# Patient Record
Sex: MALE | Race: BLACK OR AFRICAN AMERICAN
[De-identification: names, ages, dates, MRNs, and addresses within clinical notes are randomized per-mention and may not be internally consistent; named-entity substitution may affect disease eponyms.]

---

## 2017-11-10 ENCOUNTER — HOSPITAL ENCOUNTER (INPATIENT)
Dept: HOSPITAL 74 - YASAS | Age: 52
LOS: 11 days | Discharge: HOME | DRG: 772 | End: 2017-11-21
Attending: PSYCHIATRY & NEUROLOGY | Admitting: PSYCHIATRY & NEUROLOGY
Payer: COMMERCIAL

## 2017-11-10 VITALS — BODY MASS INDEX: 30.4 KG/M2

## 2017-11-10 DIAGNOSIS — F19.24: ICD-10-CM

## 2017-11-10 DIAGNOSIS — E11.9: ICD-10-CM

## 2017-11-10 DIAGNOSIS — F10.20: Primary | ICD-10-CM

## 2017-11-10 DIAGNOSIS — F16.10: ICD-10-CM

## 2017-11-10 DIAGNOSIS — I10: ICD-10-CM

## 2017-11-10 DIAGNOSIS — F41.8: ICD-10-CM

## 2017-11-10 DIAGNOSIS — F17.210: ICD-10-CM

## 2017-11-10 DIAGNOSIS — F14.10: ICD-10-CM

## 2017-11-10 LAB
ALBUMIN SERPL-MCNC: 3.4 G/DL (ref 3.4–5)
ALP SERPL-CCNC: 124 U/L (ref 45–117)
ALT SERPL-CCNC: 18 U/L (ref 12–78)
ANION GAP SERPL CALC-SCNC: 8 MMOL/L (ref 8–16)
APPEARANCE UR: (no result)
AST SERPL-CCNC: 9 U/L (ref 15–37)
BILIRUB SERPL-MCNC: 0.3 MG/DL (ref 0.2–1)
BILIRUB UR STRIP.AUTO-MCNC: NEGATIVE MG/DL
CALCIUM SERPL-MCNC: 8.7 MG/DL (ref 8.5–10.1)
CO2 SERPL-SCNC: 24 MMOL/L (ref 21–32)
COLOR UR: YELLOW
CREAT SERPL-MCNC: 1.1 MG/DL (ref 0.7–1.3)
DEPRECATED RDW RBC AUTO: 12.9 % (ref 11.9–15.9)
GLUCOSE SERPL-MCNC: 248 MG/DL (ref 74–106)
KETONES UR QL STRIP: NEGATIVE
MCH RBC QN AUTO: 30.2 PG (ref 25.7–33.7)
MCHC RBC AUTO-ENTMCNC: 34.5 G/DL (ref 32–35.9)
MCV RBC: 87.7 FL (ref 80–96)
NITRITE UR QL STRIP: NEGATIVE
PH UR: 5 [PH] (ref 5–8)
PLATELET # BLD AUTO: 265 K/MM3 (ref 134–434)
PMV BLD: 9 FL (ref 7.5–11.1)
PROT SERPL-MCNC: 6.6 G/DL (ref 6.4–8.2)
PROT UR QL STRIP: (no result)
PROT UR QL STRIP: NEGATIVE
RBC # UR STRIP: NEGATIVE /UL
SP GR UR: 1.02 (ref 1–1.03)
UROBILINOGEN UR STRIP-MCNC: NEGATIVE MG/DL (ref 0.2–1)
WBC # BLD AUTO: 9.2 K/MM3 (ref 4–10)

## 2017-11-10 PROCEDURE — G0008 ADMIN INFLUENZA VIRUS VAC: HCPCS

## 2017-11-10 PROCEDURE — HZ42ZZZ GROUP COUNSELING FOR SUBSTANCE ABUSE TREATMENT, COGNITIVE-BEHAVIORAL: ICD-10-PCS | Performed by: PSYCHIATRY & NEUROLOGY

## 2017-11-10 RX ADMIN — NICOTINE SCH: 21 PATCH TRANSDERMAL at 17:15

## 2017-11-10 RX ADMIN — Medication SCH MG: at 21:46

## 2017-11-10 RX ADMIN — LISINOPRIL SCH MG: 10 TABLET ORAL at 17:14

## 2017-11-10 RX ADMIN — METFORMIN HYDROCHLORIDE SCH MG: 500 TABLET ORAL at 17:14

## 2017-11-10 NOTE — HP
Admission [facility]


Chief Complaint: 





I AM HERE FOR REHAB FROM HEROIN


Allergies/Adverse Reactions: 


 Allergies











Allergy/AdvReac Type Severity Reaction Status Date / Time


 


No Known Allergies Allergy   Verified 11/10/17 13:51











History of Present Illness: 





THIS 52 YEARS OLD MALE WITH HEROIN DEPENDENCE,SEEKING REAHAB,LAST TREATMENT [facility] 

07/17,SUICASA 4 MONTHS AGO,


METHADONE MAINTENANCE 120 MGS/DAY,LAST MEDICATE 11/02/17


TYPE 2 DM


HYPERTENSION


DEPRESSION


NICOTINE DEPENDENCE


LONGEST PERIOD OF SOBRIETY 14 YEARS


Exam Limitations: No Limitations





- Ebola screening


Have you traveled outside of the country in the last 21 days: No (N)


Have you had contact with anyone from an Ebola affected area: No


Have you been sick,other than usual withdrawal symptoms: No


Do you have a fever: No





- Review of Systems


Constitutional: No Symptoms Reported


EENT: reports: No Symptoms Reported


Respiratory: reports: No Symptoms reported


Cardiac: reports: No Symptoms Reported


GI: reports: No Symptoms Reported


: reports: No Symptoms Reported


Musculoskeletal: reports: No Symptoms Reported (FX OF RIGHT WRIST AND TENDON 

INJURY RIGHT HAND 2 AND A HALF WEEKS AGO)


Integumentary: reports: No Symptoms Reported


Neuro: reports: No Symptoms reported


Endocrine: reports: No Symptoms Reported


Hematology: reports: No Symptoms Reported


Psychiatric: reports: No Sypmtoms Reported, Judgement Intact, Mood/Affect 

Appropiate, Anxious, Depressed





Patient History





- Patient Medical History


Hx Anemia: No


Hx Asthma: No


Hx Chronic Obstructive Pulmonary Disease (COPD): No


Hx Cancer: No


Hx Cardiac Disorders: No


Hx Congestive Heart Failure: No


Hx Hypertension: Yes (ON MED)


Hx Hypercholesterolemia: No


Hx Pacemaker: No


HX Cerebrovascular Accident: No


Hx Seizures: No


Hx Dementia: No


Hx Diabetes: Yes (TYPE 2 DM)


Hx Gastrointestinal Disorders: No


Hx Liver Disease: No


Hx Genitourinary Disorders: No


Hx Sexually Transmitted Disorders: No


Hx Renal Disease (ESRD): No


Hx Thyroid Disease: No


Hx Human Immunodeficiency Virus (HIV): No (LAST  06/17 NEGATIVE)


Hx Hepatitis C: No


Hx Depression: Yes (ANXIETY)


Hx Suicide Attempt: No


Hx Bipolar Disorder: No


Hx Schizophrenia: No


Other Medical History: NO SUICIDAL,NO HOMICIDAL





- Patient Surgical History


Past Surgical History: No





- PPD History


Previous Implant?: Yes


Documented Results: Negative w/o proof


PPD to be Administered?: Yes





- Smoking Cessation


Smoking history: Current every day smoker


Have you smoked in the past 12 months: Yes


Aproximately how many cigarettes per day: 20


Cigars Per Day: 0


Hx Chewing Tobacco Use: No


Initiated information on smoking cessation: Yes


'Breaking Loose' booklet given: 11/10/17





- Substance & Tx. History


Hx Alcohol Use: No


Hx Substance Use: Yes


Substance Use Type: Heroin


Hx Substance Use Treatment: Yes ([facility] IN 07/17)





- Substances Abused


  ** Heroin


Route: Inhalation


Frequency: Daily


Amount used: 6 bags


Age of first use: 27


Date of Last Use: 11/10/17





Family Disease History





- Family Disease History


Family History: Denies





Admission Physical Exam [facility]





- Vital Signs


Vital Signs: 


 Vital Signs - 24 hr











  11/10/17





  11:36


 


Temperature 96.2 F L


 


Pulse Rate 81


 


Respiratory 18





Rate 


 


Blood Pressure 145/93














- Physical


General Appearance: Yes: Within Normal Limits


HEENTM: Yes: Within Normal Limits, Normal ENT Inspection, BEREKET, Pharynx Normal


Respiratory: Yes: Lungs Clear, Normal Breath Sounds, No Respiratory Distress


Neck: Yes: Within Normal Limits


Breast: Yes: Within Normal Limits


Cardiology: Yes: Within Normal Limits, Regular Rhythm, Regular Rate, S1, S2


Abdominal: Yes: Within Normal Limits, Normal Bowel Sounds, Non Tender, Soft


Genitourinary: Yes: Within Normal Limits


Back: Yes: Within Normal Limits


Musculoskeletal: Yes: Muscle Pain


Extremities: Yes: Other (FX OF RIGHT WRIST AND SPRAIN OF TENDON OF RIGHT 5TH 

FINGER)


Neurological: Yes: CNs II-XII NML intact, Fully Oriented, Alert, Motor Strength 

5/5, Normal Mood/Affect


Integumentary: Yes: Within Normal Limits


Lymphatic: Yes: Within Normal Limits





- Diagnostic


(1) Heroin dependence


Current Visit: Yes   Status: Acute   





(2) Methadone maintenance therapy patient


Current Visit: Yes   Status: Acute   





(3) Essential hypertension


Current Visit: Yes   Status: Acute   





(4) DM2 (diabetes mellitus, type 2)


Current Visit: Yes   Status: Acute   





(5) Anxiety and depression


Current Visit: Yes   Status: Acute   





Cleared for Admission [facility]





- Detox or Rehab


Claeared for Rehab Admission: Yes





[facility] Breath Alcohol Content


Breath Alcohol Content: 0





Urine Drug Screen





- Results


Drug Screen Negative: No


Urine Drug Screen Results: KAITLYNN-Cocaine, OPI-Opiates, PCP-Phencyclidine, MTD-

Methadone





Inpatient Rehab Admission





- Initial Determination


Are CD services needed?: Yes


Free of communicable disease: Yes


Not in need of hospitalization: Yes





- Rehab Admission Criteria


Previous failed treatment: Yes


Poor recovery environment: Yes


Comorbidities: Yes


Patient is meeting Inpatient Rehab admission criteria:: Yes

## 2017-11-11 RX ADMIN — METFORMIN HYDROCHLORIDE SCH MG: 500 TABLET ORAL at 06:41

## 2017-11-11 RX ADMIN — PSEUDOEPHEDRINE HCL AND TRIPROLIDINE HCL PRN COMBO: 60; 2.5 TABLET, FILM COATED ORAL at 21:32

## 2017-11-11 RX ADMIN — Medication SCH TAB: at 10:15

## 2017-11-11 RX ADMIN — LISINOPRIL SCH MG: 10 TABLET ORAL at 10:15

## 2017-11-11 RX ADMIN — METFORMIN HYDROCHLORIDE SCH MG: 500 TABLET ORAL at 16:59

## 2017-11-11 RX ADMIN — NICOTINE SCH: 21 PATCH TRANSDERMAL at 10:29

## 2017-11-11 RX ADMIN — Medication SCH MG: at 21:31
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
## 2017-11-12 RX ADMIN — Medication SCH TAB: at 10:09

## 2017-11-12 RX ADMIN — NICOTINE SCH: 21 PATCH TRANSDERMAL at 10:09

## 2017-11-12 RX ADMIN — Medication SCH: at 21:42

## 2017-11-12 RX ADMIN — METFORMIN HYDROCHLORIDE SCH MG: 500 TABLET ORAL at 16:35

## 2017-11-12 RX ADMIN — LISINOPRIL SCH MG: 10 TABLET ORAL at 10:09

## 2017-11-12 RX ADMIN — METFORMIN HYDROCHLORIDE SCH MG: 500 TABLET ORAL at 06:36

## 2017-11-12 NOTE — EKG
Test Reason : 

Blood Pressure : ***/*** mmHG

Vent. Rate : 070 BPM     Atrial Rate : 070 BPM

   P-R Int : 136 ms          QRS Dur : 076 ms

    QT Int : 404 ms       P-R-T Axes : 063 -42 011 degrees

   QTc Int : 436 ms

 

NORMAL SINUS RHYTHM

LEFT AXIS DEVIATION

ABNORMAL ECG

NO PREVIOUS ECGS AVAILABLE

REPEAT EKG IF CLINICALLY INDICATED

Confirmed by RAMESH BALL MD ([id number]) on 11/12/2017 3:41:14 PM

 

Referred By: BETO TAVAREZ           Confirmed By:RAMESH BALL MD

## 2017-11-13 RX ADMIN — NICOTINE POLACRILEX PRN MG: 4 GUM, CHEWING ORAL at 09:54

## 2017-11-13 RX ADMIN — LISINOPRIL SCH MG: 10 TABLET ORAL at 09:52

## 2017-11-13 RX ADMIN — NICOTINE SCH: 21 PATCH TRANSDERMAL at 09:52

## 2017-11-13 RX ADMIN — METFORMIN HYDROCHLORIDE SCH MG: 500 TABLET ORAL at 06:08

## 2017-11-13 RX ADMIN — Medication SCH: at 21:38

## 2017-11-13 RX ADMIN — Medication SCH TAB: at 09:51

## 2017-11-13 RX ADMIN — METFORMIN HYDROCHLORIDE SCH MG: 500 TABLET ORAL at 16:41

## 2017-11-14 RX ADMIN — METFORMIN HYDROCHLORIDE SCH MG: 500 TABLET ORAL at 06:12

## 2017-11-14 RX ADMIN — NICOTINE SCH: 21 PATCH TRANSDERMAL at 10:02

## 2017-11-14 RX ADMIN — Medication SCH: at 21:21

## 2017-11-14 RX ADMIN — LISINOPRIL SCH MG: 10 TABLET ORAL at 10:02

## 2017-11-14 RX ADMIN — NICOTINE POLACRILEX PRN MG: 4 GUM, CHEWING ORAL at 10:03

## 2017-11-14 RX ADMIN — Medication SCH TAB: at 10:02

## 2017-11-14 RX ADMIN — METFORMIN HYDROCHLORIDE SCH MG: 500 TABLET ORAL at 16:46

## 2017-11-15 RX ADMIN — LISINOPRIL SCH MG: 10 TABLET ORAL at 10:07

## 2017-11-15 RX ADMIN — METFORMIN HYDROCHLORIDE SCH MG: 500 TABLET ORAL at 16:49

## 2017-11-15 RX ADMIN — NICOTINE SCH: 21 PATCH TRANSDERMAL at 10:07

## 2017-11-15 RX ADMIN — METFORMIN HYDROCHLORIDE SCH MG: 500 TABLET ORAL at 06:07

## 2017-11-15 RX ADMIN — NICOTINE POLACRILEX PRN MG: 4 GUM, CHEWING ORAL at 10:09

## 2017-11-15 RX ADMIN — Medication SCH TAB: at 10:06

## 2017-11-15 RX ADMIN — Medication SCH: at 21:17

## 2017-11-15 NOTE — PN
[facility] Progress Note (SOAP)


Subjective: 





requesting to stayt on methadone 100mg daily, does ot want to increase dose, c/

o low sex drive


Objective: 





11/15/17 15:07


 Vital Signs - 8 hr











  11/15/17 11/15/17





  07:43 10:00


 


Temperature 97.0 F L 


 


Pulse Rate 67 71


 


Respiratory 18 





Rate  


 


Blood Pressure 144/70 133/72








 Laboratory Tests











  11/10/17 11/10/17 11/10/17





  13:58 14:00 14:00


 


WBC   9.2 


 


RBC   3.59 L 


 


Hgb   10.9 L 


 


Hct   31.5 L 


 


MCV   87.7 


 


MCH   30.2 


 


MCHC   34.5 


 


RDW   12.9 


 


Plt Count   265 


 


MPV   9.0 


 


Sodium    140


 


Potassium    4.0


 


Chloride    108 H


 


Carbon Dioxide    24


 


Anion Gap    8


 


BUN    19 H


 


Creatinine    1.1


 


Creat Clearance w eGFR    > 60


 


POC Glucometer  261  


 


Random Glucose    248 H


 


Calcium    8.7


 


Total Bilirubin    0.3


 


AST    9 L


 


ALT    18


 


Alkaline Phosphatase    124 H


 


Total Protein    6.6


 


Albumin    3.4


 


Urine Color   


 


Urine Appearance   


 


Urine pH   


 


Ur Specific Gravity   


 


Urine Protein   


 


Urine Glucose (UA)   


 


Urine Ketones   


 


Urine Blood   


 


Urine Nitrite   


 


Urine Bilirubin   


 


Urine Urobilinogen   


 


Ur Leukocyte Esterase   


 


RPR Titer   














  11/10/17 11/10/17 11/10/17





  14:00 17:09 19:00


 


WBC   


 


RBC   


 


Hgb   


 


Hct   


 


MCV   


 


MCH   


 


MCHC   


 


RDW   


 


Plt Count   


 


MPV   


 


Sodium   


 


Potassium   


 


Chloride   


 


Carbon Dioxide   


 


Anion Gap   


 


BUN   


 


Creatinine   


 


Creat Clearance w eGFR   


 


POC Glucometer   191 


 


Random Glucose   


 


Calcium   


 


Total Bilirubin   


 


AST   


 


ALT   


 


Alkaline Phosphatase   


 


Total Protein   


 


Albumin   


 


Urine Color    Yellow


 


Urine Appearance    Slcloudy


 


Urine pH    5.0


 


Ur Specific Gravity    1.023


 


Urine Protein    Negative


 


Urine Glucose (UA)    3+ H


 


Urine Ketones    Negative


 


Urine Blood    Negative


 


Urine Nitrite    Negative


 


Urine Bilirubin    Negative


 


Urine Urobilinogen    Negative


 


Ur Leukocyte Esterase    Negative


 


RPR Titer  Nonreactive  














  11/11/17 11/11/17 11/12/17





  06:40 16:59 06:35


 


WBC   


 


RBC   


 


Hgb   


 


Hct   


 


MCV   


 


MCH   


 


MCHC   


 


RDW   


 


Plt Count   


 


MPV   


 


Sodium   


 


Potassium   


 


Chloride   


 


Carbon Dioxide   


 


Anion Gap   


 


BUN   


 


Creatinine   


 


Creat Clearance w eGFR   


 


POC Glucometer  165  129  168


 


Random Glucose   


 


Calcium   


 


Total Bilirubin   


 


AST   


 


ALT   


 


Alkaline Phosphatase   


 


Total Protein   


 


Albumin   


 


Urine Color   


 


Urine Appearance   


 


Urine pH   


 


Ur Specific Gravity   


 


Urine Protein   


 


Urine Glucose (UA)   


 


Urine Ketones   


 


Urine Blood   


 


Urine Nitrite   


 


Urine Bilirubin   


 


Urine Urobilinogen   


 


Ur Leukocyte Esterase   


 


RPR Titer   














  11/12/17 11/13/17 11/13/17





  16:34 06:09 16:41


 


WBC   


 


RBC   


 


Hgb   


 


Hct   


 


MCV   


 


MCH   


 


MCHC   


 


RDW   


 


Plt Count   


 


MPV   


 


Sodium   


 


Potassium   


 


Chloride   


 


Carbon Dioxide   


 


Anion Gap   


 


BUN   


 


Creatinine   


 


Creat Clearance w eGFR   


 


POC Glucometer  217  180  225


 


Random Glucose   


 


Calcium   


 


Total Bilirubin   


 


AST   


 


ALT   


 


Alkaline Phosphatase   


 


Total Protein   


 


Albumin   


 


Urine Color   


 


Urine Appearance   


 


Urine pH   


 


Ur Specific Gravity   


 


Urine Protein   


 


Urine Glucose (UA)   


 


Urine Ketones   


 


Urine Blood   


 


Urine Nitrite   


 


Urine Bilirubin   


 


Urine Urobilinogen   


 


Ur Leukocyte Esterase   


 


RPR Titer   














  11/14/17 11/14/17 11/15/17





  06:10 16:45 06:06


 


WBC   


 


RBC   


 


Hgb   


 


Hct   


 


MCV   


 


MCH   


 


MCHC   


 


RDW   


 


Plt Count   


 


MPV   


 


Sodium   


 


Potassium   


 


Chloride   


 


Carbon Dioxide   


 


Anion Gap   


 


BUN   


 


Creatinine   


 


Creat Clearance w eGFR   


 


POC Glucometer  174  192  153


 


Random Glucose   


 


Calcium   


 


Total Bilirubin   


 


AST   


 


ALT   


 


Alkaline Phosphatase   


 


Total Protein   


 


Albumin   


 


Urine Color   


 


Urine Appearance   


 


Urine pH   


 


Ur Specific Gravity   


 


Urine Protein   


 


Urine Glucose (UA)   


 


Urine Ketones   


 


Urine Blood   


 


Urine Nitrite   


 


Urine Bilirubin   


 


Urine Urobilinogen   


 


Ur Leukocyte Esterase   


 


RPR Titer   








hyperglycemia


Assessment: 





11/15/17 15:07


low sex drive, adequate methadone dose


Plan: 





reviewed medication , only methadone but patient reports has not affected sex 

drive in past, anemia noted, MVI ordered, follow up with PCP when discharged or 

psychaitrist in rehab. d/c dose escalation at patient request.

## 2017-11-16 RX ADMIN — Medication SCH TAB: at 10:15

## 2017-11-16 RX ADMIN — PSEUDOEPHEDRINE HCL AND TRIPROLIDINE HCL PRN COMBO: 60; 2.5 TABLET, FILM COATED ORAL at 08:46

## 2017-11-16 RX ADMIN — NICOTINE SCH: 21 PATCH TRANSDERMAL at 10:15

## 2017-11-16 RX ADMIN — METFORMIN HYDROCHLORIDE SCH MG: 500 TABLET ORAL at 06:22

## 2017-11-16 RX ADMIN — NICOTINE POLACRILEX PRN MG: 4 GUM, CHEWING ORAL at 10:17

## 2017-11-16 RX ADMIN — METHADONE HYDROCHLORIDE SCH MG: 40 TABLET ORAL at 06:22

## 2017-11-16 RX ADMIN — LISINOPRIL SCH MG: 10 TABLET ORAL at 10:15

## 2017-11-16 RX ADMIN — METFORMIN HYDROCHLORIDE SCH MG: 500 TABLET ORAL at 16:42

## 2017-11-16 RX ADMIN — Medication SCH MG: at 21:23

## 2017-11-16 NOTE — PN
Psychiatric Progress Note


Vital Signs: 


 Vital Signs











 Period  Temp  Pulse  Resp  BP Sys/Cabrera  Pulse Ox


 


 Last 24 Hr  97.9 F  61  18-20  119/74  











Date of Session: 11/16/17


Chief Complaint:: progress update


HPI: patient is addressing opioid, nicotine dependence comoerbid substance 

induced mood siorder.


ROS: WNL


Current Medications: 


Active Medications











Generic Name Dose Route Start Last Admin





  Trade Name Freq  PRN Reason Stop Dose Admin


 


Acetaminophen  650 mg  11/10/17 14:17  





  Tylenol -  PO   





  Q4H PRN   





  PAIN   


 


Al Hydroxide/Mg Hydroxide  30 ml  11/10/17 14:17  





  Mylanta Oral Suspension -  PO   





  Q6H PRN   





  DYSPEPSIA   


 


Bupropion HCl  150 mg  11/16/17 13:22  





  Wellbutrin -  PO   





  DAILY [facility]   


 


Escitalopram Oxalate  10 mg  11/17/17 10:00  





  Lexapro -  PO   





  DAILY [facility]   


 


Eucalyptus/Menthol/Phenol/Sorbitol  1 each  11/10/17 14:17  





  Cepastat Lozenge -  MM   





  Q4H PRN   





  SORE THROAT   


 


Guaifenesin  10 ml  11/10/17 14:17  





  Robitussin Dm -  PO   





  Q6H PRN   





  COUGH   


 


Hydroxyzine Pamoate  50 mg  11/10/17 14:17  11/10/17 21:47





  Vistaril -  PO   50 mg





  Q4H PRN   Administration





  AGITATION   


 


Ibuprofen  400 mg  11/10/17 14:17  





  Motrin -  PO   





  Q6H PRN   





  SEVERE PAIN   


 


Lisinopril  10 mg  11/10/17 14:30  11/16/17 10:15





  Prinivil  PO   10 mg





  DAILY RADHA   Administration


 


Loperamide HCl  4 mg  11/10/17 14:17  





  Imodium -  PO   





  Q6H PRN   





  DIARRHEA   


 


Magnesium Citrate  300 ml  11/10/17 14:17  11/14/17 08:44





  Citroma -  PO   300 ml





  Q48H PRN   Administration





  CONSTIPATION   


 


Magnesium Hydroxide  30 ml  11/10/17 14:17  





  Milk Of Magnesia -  PO   





  DAILY PRN   





  CONSTIPATION   


 


Metformin HCl  1,000 mg  11/10/17 16:30  11/16/17 06:22





  Glucophage -  PO   1,000 mg





  BID@0700,1630 RADHA   Administration


 


Methadone HCl 80 mg/ Methadone  100 mg  11/16/17 06:00  11/16/17 06:22





  HCl 20 mg  PO   100 mg





  DAILY@0600 RADHA   Administration


 


Nicotine  21 mg  11/10/17 14:30  11/16/17 10:15





  Nicoderm Patch -  TD   Not Given





  DAILY RADHA   


 


Nicotine Polacrilex  4 mg  11/12/17 11:32  11/16/17 10:17





  Nicorette Gum -  BUC   4 mg





  Q2H PRN   Administration





  NICOTINE REPLACEMENT RX   


 


Prenatal Multivit/Folic Acid/Iron  1 tab  11/11/17 10:00  11/16/17 10:15





  Prenatal Vitamins (Sjr) -  PO   1 tab





  DAILY RADHA   Administration


 


Pseudoephedrine/Triprolidine  1 combo  11/10/17 14:17  11/16/17 08:46





  Actifed -  PO   1 combo





  TID PRN   Administration





  NASAL CONGESTION   


 


Thiamine HCl  100 mg  11/10/17 22:00  11/15/17 21:17





  Vitamin B1 -  PO   Not Given





  HS RADHA   











Current Side Effect: No


Lab tests ordered: No


Lab tests reviewed: Yes


Provider note:: Reviewed the chart,  admission note appreciated, met 

with the patient today. Patient reports has been feeling depressed and stated 

he wants to continue his antidepressants, reports was on lexapro 10 mg and 

wellbutrin 300 mg , he was non-compliant with medications and states stopped 

about 4 months ago, after evaluation  started wellbutrin 75 mg, 

reviewed medications with the patient, discussed treatment plan, patient agreed

, will increase wellbutrin 150 mg daily, add lexapro 10 mg, psycheducation and 

supportive therapy provided, continue to monitor progress.


Total face to face time:: 35





Mental Status Exam





- Mental Status Exam


Alert and Oriented to: Time, Place, Person


Cognitive Function: Good


Patient Appearance: Well Groomed


Mood: Sad


Affect: Mood Congruent


Patient Behavior: Appropriate, Cooperative


Speech Pattern: Clear, Appropriate


Voice Loudness: Normal


Thought Process: Intact, Goal Oriented


Thought Disorder: Not Present


Hallucinations: Denies


Suicidal Ideation: Denies


Homicidal Ideation: Denies


Insight/Judgement: Fair


Sleep: Fair


Appetite: Fair


Muscle strength/Tone: Normal


Gait/Station: Normal





Psychiatric Treatment Plan





- Problem List


(1) Heroin dependence


Current Visit: Yes   





(2) Methadone maintenance therapy patient


Current Visit: Yes   





(3) Nicotine dependence


Current Visit: Yes   





(4) Opioid dependence on agonist therapy


Current Visit: Yes

## 2017-11-17 RX ADMIN — NICOTINE SCH: 21 PATCH TRANSDERMAL at 10:03

## 2017-11-17 RX ADMIN — ESCITALOPRAM OXALATE SCH MG: 10 TABLET, FILM COATED ORAL at 10:02

## 2017-11-17 RX ADMIN — METFORMIN HYDROCHLORIDE SCH MG: 500 TABLET ORAL at 06:25

## 2017-11-17 RX ADMIN — Medication SCH TAB: at 10:02

## 2017-11-17 RX ADMIN — Medication SCH MG: at 21:25

## 2017-11-17 RX ADMIN — METFORMIN HYDROCHLORIDE SCH MG: 500 TABLET ORAL at 16:42

## 2017-11-17 RX ADMIN — METHADONE HYDROCHLORIDE SCH MG: 40 TABLET ORAL at 06:24

## 2017-11-17 RX ADMIN — LISINOPRIL SCH MG: 10 TABLET ORAL at 10:02

## 2017-11-17 RX ADMIN — NICOTINE POLACRILEX PRN MG: 4 GUM, CHEWING ORAL at 10:04

## 2017-11-17 RX ADMIN — TOLNAFTATE SCH APPLIC: 10 CREAM TOPICAL at 21:25

## 2017-11-18 RX ADMIN — METFORMIN HYDROCHLORIDE SCH MG: 500 TABLET ORAL at 16:39

## 2017-11-18 RX ADMIN — METFORMIN HYDROCHLORIDE SCH MG: 500 TABLET ORAL at 06:14

## 2017-11-18 RX ADMIN — METHADONE HYDROCHLORIDE SCH MG: 40 TABLET ORAL at 06:14

## 2017-11-18 RX ADMIN — PSEUDOEPHEDRINE HCL AND TRIPROLIDINE HCL PRN COMBO: 60; 2.5 TABLET, FILM COATED ORAL at 10:01

## 2017-11-18 RX ADMIN — NICOTINE SCH: 21 PATCH TRANSDERMAL at 10:00

## 2017-11-18 RX ADMIN — TOLNAFTATE SCH APPLIC: 10 CREAM TOPICAL at 21:22

## 2017-11-18 RX ADMIN — LISINOPRIL SCH MG: 10 TABLET ORAL at 09:59

## 2017-11-18 RX ADMIN — Medication SCH TAB: at 09:59

## 2017-11-18 RX ADMIN — TOLNAFTATE SCH APPLIC: 10 CREAM TOPICAL at 10:00

## 2017-11-18 RX ADMIN — ESCITALOPRAM OXALATE SCH MG: 10 TABLET, FILM COATED ORAL at 09:59

## 2017-11-18 RX ADMIN — Medication SCH MG: at 21:21

## 2017-11-19 RX ADMIN — TOLNAFTATE SCH APPLIC: 10 CREAM TOPICAL at 21:34

## 2017-11-19 RX ADMIN — TOLNAFTATE SCH APPLIC: 10 CREAM TOPICAL at 10:00

## 2017-11-19 RX ADMIN — LISINOPRIL SCH MG: 10 TABLET ORAL at 10:01

## 2017-11-19 RX ADMIN — Medication SCH MG: at 21:32

## 2017-11-19 RX ADMIN — ESCITALOPRAM OXALATE SCH MG: 10 TABLET, FILM COATED ORAL at 09:59

## 2017-11-19 RX ADMIN — Medication SCH TAB: at 09:58

## 2017-11-19 RX ADMIN — METFORMIN HYDROCHLORIDE SCH MG: 500 TABLET ORAL at 16:37

## 2017-11-19 RX ADMIN — NICOTINE POLACRILEX PRN MG: 4 GUM, CHEWING ORAL at 10:01

## 2017-11-19 RX ADMIN — METFORMIN HYDROCHLORIDE SCH MG: 500 TABLET ORAL at 06:12

## 2017-11-19 RX ADMIN — METHADONE HYDROCHLORIDE SCH MG: 40 TABLET ORAL at 06:12

## 2017-11-19 RX ADMIN — NICOTINE SCH: 21 PATCH TRANSDERMAL at 09:59

## 2017-11-20 VITALS — TEMPERATURE: 98.1 F

## 2017-11-20 RX ADMIN — METFORMIN HYDROCHLORIDE SCH MG: 500 TABLET ORAL at 06:11

## 2017-11-20 RX ADMIN — ESCITALOPRAM OXALATE SCH MG: 10 TABLET, FILM COATED ORAL at 10:34

## 2017-11-20 RX ADMIN — TOLNAFTATE SCH: 10 CREAM TOPICAL at 21:38

## 2017-11-20 RX ADMIN — METHADONE HYDROCHLORIDE SCH MG: 40 TABLET ORAL at 06:11

## 2017-11-20 RX ADMIN — Medication SCH MG: at 21:37

## 2017-11-20 RX ADMIN — LISINOPRIL SCH MG: 10 TABLET ORAL at 10:34

## 2017-11-20 RX ADMIN — METFORMIN HYDROCHLORIDE SCH MG: 500 TABLET ORAL at 16:37

## 2017-11-20 RX ADMIN — TOLNAFTATE SCH APPLIC: 10 CREAM TOPICAL at 10:35

## 2017-11-20 RX ADMIN — Medication SCH TAB: at 10:34

## 2017-11-20 RX ADMIN — NICOTINE SCH: 21 PATCH TRANSDERMAL at 10:34

## 2017-11-21 VITALS — DIASTOLIC BLOOD PRESSURE: 73 MMHG | SYSTOLIC BLOOD PRESSURE: 140 MMHG | HEART RATE: 84 BPM

## 2017-11-21 RX ADMIN — TOLNAFTATE SCH APPLIC: 10 CREAM TOPICAL at 09:37

## 2017-11-21 RX ADMIN — ESCITALOPRAM OXALATE SCH MG: 10 TABLET, FILM COATED ORAL at 09:37

## 2017-11-21 RX ADMIN — METHADONE HYDROCHLORIDE SCH MG: 40 TABLET ORAL at 06:27

## 2017-11-21 RX ADMIN — LISINOPRIL SCH MG: 10 TABLET ORAL at 09:37

## 2017-11-21 RX ADMIN — Medication SCH TAB: at 09:37

## 2017-11-21 RX ADMIN — METFORMIN HYDROCHLORIDE SCH MG: 500 TABLET ORAL at 06:28

## 2017-11-21 RX ADMIN — NICOTINE SCH: 21 PATCH TRANSDERMAL at 09:36

## 2017-11-21 NOTE — PN
Psychiatric Progress Note


Vital Signs: 


 Vital Signs











 Period  Temp  Pulse  Resp  BP Sys/Cabrera  Pulse Ox


 


 Last 24 Hr    66-84  16-81  131-140/71-73  











Date of Session: 11/21/17


Chief Complaint:: discharge visit


HPI: Patient has addressed opioid, nicotine dependence, PCP and cocaine abuse 

comorbid substance induced mood disorder.


ROS: HTN, DM medically managed.


Current Medications: 


Active Medications











Generic Name Dose Route Start Last Admin





  Trade Name Freq  PRN Reason Stop Dose Admin


 


Acetaminophen  650 mg  11/10/17 14:17  11/17/17 08:05





  Tylenol -  PO   650 mg





  Q4H PRN   Administration





  PAIN   


 


Al Hydroxide/Mg Hydroxide  30 ml  11/10/17 14:17  





  Mylanta Oral Suspension -  PO   





  Q6H PRN   





  DYSPEPSIA   


 


Bupropion HCl  150 mg  11/17/17 10:00  11/21/17 09:37





  Wellbutrin -  PO   150 mg





  DAILY RADHA   Administration


 


Escitalopram Oxalate  10 mg  11/17/17 10:00  11/21/17 09:37





  Lexapro -  PO   10 mg





  DAILY RADHA   Administration


 


Eucalyptus/Menthol/Phenol/Sorbitol  1 each  11/10/17 14:17  





  Cepastat Lozenge -  MM   





  Q4H PRN   





  SORE THROAT   


 


Guaifenesin  10 ml  11/10/17 14:17  11/16/17 21:23





  Robitussin Dm -  PO   10 ml





  Q6H PRN   Administration





  COUGH   


 


Hydroxyzine Pamoate  50 mg  11/10/17 14:17  11/10/17 21:47





  Vistaril -  PO   50 mg





  Q4H PRN   Administration





  AGITATION   


 


Ibuprofen  400 mg  11/10/17 14:17  





  Motrin -  PO   





  Q6H PRN   





  SEVERE PAIN   


 


Lisinopril  10 mg  11/10/17 14:30  11/21/17 09:37





  Prinivil  PO   10 mg





  DAILY RADHA   Administration


 


Loperamide HCl  4 mg  11/10/17 14:17  





  Imodium -  PO   





  Q6H PRN   





  DIARRHEA   


 


Magnesium Citrate  300 ml  11/10/17 14:17  11/14/17 08:44





  Citroma -  PO   300 ml





  Q48H PRN   Administration





  CONSTIPATION   


 


Magnesium Hydroxide  30 ml  11/10/17 14:17  11/20/17 21:39





  Milk Of Magnesia -  PO   30 ml





  DAILY PRN   Administration





  CONSTIPATION   


 


Metformin HCl  1,000 mg  11/10/17 16:30  11/21/17 06:28





  Glucophage -  PO   1,000 mg





  BID@0700,1630 RADHA   Administration


 


Methadone HCl 80 mg/ Methadone  100 mg  11/16/17 06:00  11/21/17 06:27





  HCl 20 mg  PO   100 mg





  DAILY@0600 RADHA   Administration


 


Nicotine  21 mg  11/10/17 14:30  11/21/17 09:36





  Nicoderm Patch -  TD   Not Given





  DAILY RADHA   


 


Nicotine Polacrilex  4 mg  11/12/17 11:32  11/19/17 10:01





  Nicorette Gum -  BUC   4 mg





  Q2H PRN   Administration





  NICOTINE REPLACEMENT RX   


 


Prenatal Multivit/Folic Acid/Iron  1 tab  11/11/17 10:00  11/21/17 09:37





  Prenatal Vitamins (Sjr) -  PO   1 tab





  DAILY RADHA   Administration


 


Pseudoephedrine/Triprolidine  1 combo  11/10/17 14:17  11/18/17 10:01





  Actifed -  PO   1 combo





  TID PRN   Administration





  NASAL CONGESTION   


 


Thiamine HCl  100 mg  11/10/17 22:00  11/20/17 21:37





  Vitamin B1 -  PO   100 mg





  HS RADHA   Administration


 


Tolnaftate  1 applic  11/17/17 22:00  11/21/17 09:37





  Tinactin 1% Cream -  TP   1 applic





  BID RADHA   Administration











Current Side Effect: No


Lab tests ordered: No


Lab tests reviewed: Yes


Provider note:: Patient has completed today his treatment and met his goals, 

will continue to address his issues at [facility] outpatient rehabilitation program. 

Patient gained insights into importance of changing attitudes/behavior for 

utilization of  supports available to prevent relapses. Medications(Wellbutrin 

and Lexapro) well tolerated, patient reports he feels much better, his mood 

improved, he is  hopeful, he will f/u by a psychiatrist at [facility] center. Patient 

wwas encouraged to take medications as directed. Scripts provided for 30 days, 

patient is stable for discharge today.


Total face to face time:: 15





Mental Status Exam





- Mental Status Exam


Alert and Oriented to: Time, Place, Person


Cognitive Function: Good


Patient Appearance: Well Groomed


Mood: Hopeful


Affect: Appropriate, Mood Congruent


Patient Behavior: Appropriate, Cooperative


Speech Pattern: Clear, Appropriate


Voice Loudness: Normal


Thought Process: Intact, Goal Oriented


Thought Disorder: Not Present


Hallucinations: Denies


Suicidal Ideation: Denies


Homicidal Ideation: Denies


Insight/Judgement: Fair


Appetite: Fair


Muscle strength/Tone: Normal


Gait/Station: Normal

## 2019-08-24 ENCOUNTER — HOSPITAL ENCOUNTER (EMERGENCY)
Dept: HOSPITAL 74 - JER | Age: 54
LOS: 1 days | Discharge: ANOTHER HOSPITAL | End: 2019-08-25
Payer: COMMERCIAL

## 2019-08-24 VITALS — BODY MASS INDEX: 28.5 KG/M2

## 2019-08-25 VITALS — SYSTOLIC BLOOD PRESSURE: 105 MMHG | DIASTOLIC BLOOD PRESSURE: 64 MMHG | HEART RATE: 61 BPM

## 2019-08-25 VITALS — TEMPERATURE: 98.2 F

## 2019-08-25 LAB
ALBUMIN SERPL-MCNC: 4.5 G/DL (ref 3.4–5)
ALP SERPL-CCNC: 131 U/L (ref 45–117)
ALT SERPL-CCNC: 15 U/L (ref 13–61)
ANION GAP SERPL CALC-SCNC: 10 MMOL/L (ref 8–16)
APTT BLD: 37.2 SECONDS (ref 25.2–36.5)
AST SERPL-CCNC: 13 U/L (ref 15–37)
BASOPHILS # BLD: 1 % (ref 0–2)
BILIRUB SERPL-MCNC: 0.6 MG/DL (ref 0.2–1)
BUN SERPL-MCNC: 24.5 MG/DL (ref 7–18)
CALCIUM SERPL-MCNC: 9.5 MG/DL (ref 8.5–10.1)
CHLORIDE SERPL-SCNC: 108 MMOL/L (ref 98–107)
CO2 SERPL-SCNC: 22 MMOL/L (ref 21–32)
CREAT SERPL-MCNC: 1.5 MG/DL (ref 0.55–1.3)
DEPRECATED RDW RBC AUTO: 13.6 % (ref 11.9–15.9)
EOSINOPHIL # BLD: 1.4 % (ref 0–4.5)
GLUCOSE SERPL-MCNC: 192 MG/DL (ref 74–106)
HCT VFR BLD CALC: 41.7 % (ref 35.4–49)
HGB BLD-MCNC: 14 GM/DL (ref 11.7–16.9)
INR BLD: 1.03 (ref 0.83–1.09)
LYMPHOCYTES # BLD: 16.5 % (ref 8–40)
MCH RBC QN AUTO: 30 PG (ref 25.7–33.7)
MCHC RBC AUTO-ENTMCNC: 33.6 G/DL (ref 32–35.9)
MCV RBC: 89.4 FL (ref 80–96)
MONOCYTES # BLD AUTO: 8.7 % (ref 3.8–10.2)
NEUTROPHILS # BLD: 72.4 % (ref 42.8–82.8)
PLATELET # BLD AUTO: 301 K/MM3 (ref 134–434)
PMV BLD: 9 FL (ref 7.5–11.1)
POTASSIUM SERPLBLD-SCNC: 4.4 MMOL/L (ref 3.5–5.1)
PROT SERPL-MCNC: 8.4 G/DL (ref 6.4–8.2)
PT PNL PPP: 12.2 SEC (ref 9.7–13)
RBC # BLD AUTO: 4.67 M/MM3 (ref 4–5.6)
SODIUM SERPL-SCNC: 140 MMOL/L (ref 136–145)
WBC # BLD AUTO: 12.1 K/MM3 (ref 4–10)

## 2019-08-25 NOTE — PDOC
History of Present Illness





- General


Chief Complaint: Wound


Stated Complaint: POSSIBLE BUG BITE LT HAND


Time Seen by Provider: 08/25/19 00:36


History Source: Patient


Exam Limitations: No Limitations





- History of Present Illness


Initial Comments: 


54 year old male with PMH HTN, DM presented to ED for increasing left hand 

swelling/redness x1 week, worsening over the last couple of days. Pt reported 

that he saw he was bitten by a bug x7 days ago, had the wound drained at the 

care home he was at at the time, then it recurred. Pt reported he has had increasing 

difficulty flexing his fingers. Pt denied fever, chills, nausea, vomiting, 

abdominal pain, sweats, numbness, tingling or any other symptoms. 





PCP: Dr. Tamayo





Past History





- Past Medical History


Allergies/Adverse Reactions: 


 Allergies











Allergy/AdvReac Type Severity Reaction Status Date / Time


 


No Known Allergies Allergy   Verified 08/25/19 03:56











Home Medications: 


Ambulatory Orders





Lisinopril 10 mg PO DAILY #30 tablet 11/20/17 


Metformin HCl [Glucophage] 1,000 mg PO BID #60 tablet 11/20/17 








Anemia: No


Asthma: No


Cancer: No


Cardiac Disorders: Yes


CVA: No


COPD: No


CHF: No


Dementia: No


Diabetes: Yes (NIDDM)


GI Disorders: No


 Disorders: No


HTN: Yes


Hypercholesterolemia: No


Kidney Stones: No


Liver Disease: No


Seizures: No


Thyroid Disease: No





- Suicide/Smoking/Psychosocial Hx


Smoking History: Never smoked


Have you smoked in the past 12 months: Yes


Number of Cigarettes Smoked Daily: 20


Cigars Per Day: 0


'Breaking Loose' booklet given: 11/10/17


Hx Alcohol Use: No


Drug/Substance Use Hx: No


Substance Use Type: Heroin


Hx Substance Use Treatment: Yes





**Review of Systems





- Review of Systems


Able to Perform ROS?: Yes


Comments:: 


General: denied fever, chills, generalized weakness.


HEENT: denied sore throat, rhinorrhea, ear pain.


Cardiovascular: denied chest pain, palpitations, syncope, diaphoresis.


Respiratory: denied shortness of breath, cough, sputum production, hemoptysis.


Gastrointestinal: denied abdominal pain, nausea, vomiting, diarrhea, 

constipation, blood in stool.


Genitourinary: denied dysuria, increased urinary frequency, hematuria, urinary 

incontinence, flank pain.


Back: denied back pain.


Musculoskeletal: admitted to hand pain, hand swelling, hand redness. 


Neurological: denied headache, dizziness, numbness, tingling, weakness. 


Integumentary: admitted to erythema. denied laceration, abrasion.


Hematologic/Lymphatic: denied bruising or bleeding. 





*Physical Exam





- Vital Signs


 Last Vital Signs











Temp Pulse Resp BP Pulse Ox


 


 98.1 F   97 H  20   156/84   95 


 


 08/24/19 23:13  08/24/19 23:13  08/24/19 23:13  08/24/19 23:13  08/24/19 23:13














- Physical Exam


Comments: 


Constitutional: Well-nourished, Well-developed, appearing stated age.


HEENT: head is normocephalic, atraumatic. EOMI. PERRLA. 


Neck: supple. Full ROM.


Cardiovascular: regular heart rhythm. no murmurs. no pericardial friction rub. 


Respiratory: clear to auscultation bilaterally. no crackles, rhonchi or 

wheezing. no stridor.


Gastrointestinal: soft, nontender. normal bowel sounds. no rebound, guarding, 

masses. 


Extremities: left hand dorsum surface 3x3 cm fluctuant mass with purulent 

drainage and surrounding erythema 6x6 cm. peripheral pulses intact. no lower 

extremity edema. pt is unable to flex all of his left hand fingers. no pain to 

palpation of flexor tendon of all 5 left hand fingers. pain with passive 

extension and flexion of all five fingers. no sausage fingers. 


Neurological: CN 2-12 grossly intact. moves all four extremities. 


Psych: awake, alert, oriented x3. follows commands. answers questions 

appropriately. 








ED Treatment Course





- LABORATORY


CBC & Chemistry Diagram: 


 08/25/19 02:50





 08/25/19 02:50





Medical Decision Making





- Medical Decision Making


54 year old male with above PMH presented to ED for left hand swelling/redness 

x1 week, worsening over the last 2 days. 


Examination significant for abscess to dorsum of hand with surrounding 

cellulitis. 


Hand abscess needs evaluation by Hand Surgery, Dr. Buckner. Will place consult 

and admit patient for IV antibiotics. 





 Initial Vital Signs











Temp Pulse Resp BP Pulse Ox


 


 98.1 F   97 H  20   156/84   95 


 


 08/24/19 23:13  08/24/19 23:13  08/24/19 23:13  08/24/19 23:13  08/24/19 23:13








Afebrile.


No tachycardia. 


No tachypnea. 


Mild hypertension. 


No hypoxia on room air. 





Labs ordered: CBC, CMP, blood cultures, coags, T&S


Imaging ordered: Hand XR


Medications ordered: tylenol 975 mg PO once, normal saline bolus 1000 cc, 

vancomycin, zosyn





EKG performed at 0411: rate 61, regular rhythm, normal axis, normal intervals, 

no acute ST changes. 





Bedside US showed a hypodense fluid collection consistent with abscess, with 

cobblestoning consistent with cellulitis. 





08/25/19 04:31


 CBC











WBC  12.1 K/mm3 (4.0-10.0)  H  08/25/19  02:50    


 


RBC  4.67 M/mm3 (4.00-5.60)   08/25/19  02:50    


 


Hgb  14.0 GM/dL (11.7-16.9)   08/25/19  02:50    


 


Hct  41.7 % (35.4-49)  D 08/25/19  02:50    


 


MCV  89.4 fl (80-96)   08/25/19  02:50    


 


MCH  30.0 pg (25.7-33.7)   08/25/19  02:50    


 


MCHC  33.6 g/dl (32.0-35.9)   08/25/19  02:50    


 


RDW  13.6 % (11.9-15.9)   08/25/19  02:50    


 


Plt Count  301 K/MM3 (134-434)   08/25/19  02:50    


 


MPV  9.0 fl (7.5-11.1)   08/25/19  02:50    


 


Absolute Neuts (auto)  8.7 K/mm3 (1.5-8.0)  H  08/25/19  02:50    


 


Neutrophils %  72.4 % (42.8-82.8)   08/25/19  02:50    


 


Lymphocytes %  16.5 % (8-40)   08/25/19  02:50    


 


Monocytes %  8.7 % (3.8-10.2)   08/25/19  02:50    


 


Eosinophils %  1.4 % (0-4.5)   08/25/19  02:50    


 


Basophils %  1.0 % (0-2.0)   08/25/19  02:50    


 


Nucleated RBC %  0 % (0-0)   08/25/19  02:50    








 CMP











Sodium  140 mmol/L (136-145)   08/25/19  02:50    


 


Potassium  4.4 mmol/L (3.5-5.1)   08/25/19  02:50    


 


Chloride  108 mmol/L ()  H  08/25/19  02:50    


 


Carbon Dioxide  22 mmol/L (21-32)   08/25/19  02:50    


 


Anion Gap  10 MMOL/L (8-16)   08/25/19  02:50    


 


BUN  24.5 mg/dL (7-18)  H  08/25/19  02:50    


 


Creatinine  1.5 mg/dL (0.55-1.3)  H  08/25/19  02:50    


 


Est GFR (CKD-EPI)AfAm  59.88   08/25/19  02:50    


 


Est GFR (CKD-EPI)NonAf  51.67   08/25/19  02:50    


 


Random Glucose  192 mg/dL ()  H  08/25/19  02:50    


 


Calcium  9.5 mg/dL (8.5-10.1)   08/25/19  02:50    


 


Total Bilirubin  0.6 mg/dL (0.2-1)   08/25/19  02:50    


 


AST  13 U/L (15-37)  L  08/25/19  02:50    


 


ALT  15 U/L (13-61)   08/25/19  02:50    


 


Alkaline Phosphatase  131 U/L ()  H  08/25/19  02:50    


 


Total Protein  8.4 g/dl (6.4-8.2)  H  08/25/19  02:50    


 


Albumin  4.5 g/dl (3.4-5.0)   08/25/19  02:50    








Leukocytosis with left shift. 


JAMES - IV fluids running


Mild ALP elevation





XR hand my read - no foreign body. no fracture. no dislocation. 


CXR my read - sharp costophrenic angles. no infiltrate. 


-Pending official reports. 





Pt to be admitted for surgical evaluation of his hand abscess, IV antibiotics 

for overlying cellulitis. 





08/25/19 05:29


Pt to be admitted under Dr. Gray's service. 


Dr. Buckner (Hand on call) paged. 





08/25/19 05:51


I spoke with Dr. Buckner' who stated he is not on call for Hand. 


Transfer to [facility] initiated. 


Inpatient team updated. 





08/25/19 06:18


I spoke with Ortho Resident at [facility] - Dr. Gilbert - who accepted the patient 

for transfer. 


I spoke with ED Attending at [facility] - Dr. Sheriff - who accepted the patient 

for transfer. 


Pending transfer. Estimated time for arrival of ambulance ~1 hour. 





*DC/Admit/Observation/Transfer


Diagnosis at time of Disposition: 


 Hand abscess, Cellulitis, Leukocytosis








- Discharge Dispostion


Disposition: TRANSFER ACUTE CARE/OTHER HOSP


Condition at time of disposition: Stable


Decision to Admit order: Yes





- Referrals





- Patient Instructions





- Post Discharge Activity

## 2019-08-25 NOTE — PDOC
History of Present Illness





- General


Chief Complaint: Wound


Stated Complaint: POSSIBLE BUG BITE LT HAND


Time Seen by Provider: 08/25/19 00:36





- History of Present Illness


Initial Comments: 





08/25/19 02:15


Patient is a 54-year-old male with history of diabetes here with left hand 

swelling and pain.  Noted to have a cellulitis of the left hand with diffiuclt 

to make a fist.  Will transfer to the main for further workup and possible 

admission.  





Past History





- Past Medical History


Allergies/Adverse Reactions: 


 Allergies











Allergy/AdvReac Type Severity Reaction Status Date / Time


 


No Known Allergies Allergy   Verified 11/10/17 13:51











Home Medications: 


Ambulatory Orders





Bupropion HCl [Wellbutrin -] 0 mg PO DAILY 11/10/17 


Lisinopril 10 mg PO DAILY #30 tablet 11/20/17 


Metformin HCl [Glucophage] 1,000 mg PO BID #60 tablet 11/20/17 


Bupropion HCl [Wellbutrin -] 150 mg PO DAILY #30 tablet 11/21/17 


Escitalopram Oxalate [Lexapro -] 10 mg PO DAILY #30 tablet 11/21/17 








Anemia: No


Asthma: No


Cancer: No


Cardiac Disorders: Yes


CVA: No


COPD: No


CHF: No


Dementia: No


Diabetes: Yes (NIDDM)


GI Disorders: No


 Disorders: No


HTN: Yes


Hypercholesterolemia: No


Kidney Stones: No


Liver Disease: No


Seizures: No


Thyroid Disease: No





- Suicide/Smoking/Psychosocial Hx


Smoking History: Never smoked


Have you smoked in the past 12 months: Yes


Number of Cigarettes Smoked Daily: 20


Cigars Per Day: 0


'Breaking Loose' booklet given: 11/10/17


Hx Alcohol Use: No


Drug/Substance Use Hx: No


Substance Use Type: Heroin


Hx Substance Use Treatment: Yes





*Physical Exam





- Vital Signs


 Last Vital Signs











Temp Pulse Resp BP Pulse Ox


 


 98.1 F   97 H  20   156/84   95 


 


 08/24/19 23:13  08/24/19 23:13  08/24/19 23:13  08/24/19 23:13  08/24/19 23:13














*DC/Admit/Observation/Transfer





- Referrals


Referrals: 


ON STAFF,NOT [Primary Care Provider] - 





- Patient Instructions





- Post Discharge Activity

## 2019-08-25 NOTE — PDOC
Attending Attestation





- Resident


Resident Name: Giselle Ansari





- ED Attending Attestation


I have performed the following: I have examined & evaluated the patient, The 

case was reviewed & discussed with the resident, I agree w/resident's findings 

& plan





- HPI


HPI: 





08/25/19 05:24


Pt comes with abscess of the left hand.  [facility] bite [facility] 

[facility] was [facility] up until this week.


Pt has redness of the entire palm and dorsal hand.





- Physicial Exam


PE: 





08/25/19 05:24


Agree with resident exam.





- Medical Decision Making





08/25/19 06:44


Pt was not admitted to hospitalist, because we have no hand surg on call. Pt 

will be transferred to [facility].

## 2019-08-25 NOTE — EKG
Test Reason : 

Blood Pressure : ***/*** mmHG

Vent. Rate : 061 BPM     Atrial Rate : 061 BPM

   P-R Int : 134 ms          QRS Dur : 084 ms

    QT Int : 438 ms       P-R-T Axes : 036 002 036 degrees

   QTc Int : 440 ms

 

NORMAL SINUS RHYTHM

NORMAL ECG

WHEN COMPARED WITH ECG OF 10-NOV-2017 22:34,

COMPARED TO EKG

NO SIGNIFICANT CHANGE IS FOUND

Confirmed by GEORGIE KNOWLES, BRITT ([id number]) on 8/25/2019 11:23:01 AM

 

Referred By:             Confirmed By:BRITT JACQUES MD